# Patient Record
Sex: MALE | Race: WHITE | NOT HISPANIC OR LATINO | Employment: FULL TIME | URBAN - METROPOLITAN AREA
[De-identification: names, ages, dates, MRNs, and addresses within clinical notes are randomized per-mention and may not be internally consistent; named-entity substitution may affect disease eponyms.]

---

## 2017-08-29 ENCOUNTER — OFFICE VISIT (OUTPATIENT)
Dept: UROLOGY | Facility: CLINIC | Age: 57
End: 2017-08-29
Payer: COMMERCIAL

## 2017-08-29 VITALS
WEIGHT: 255 LBS | SYSTOLIC BLOOD PRESSURE: 132 MMHG | HEIGHT: 71 IN | HEART RATE: 72 BPM | BODY MASS INDEX: 35.7 KG/M2 | DIASTOLIC BLOOD PRESSURE: 64 MMHG

## 2017-08-29 DIAGNOSIS — N23 RENAL COLIC ON LEFT SIDE: Primary | ICD-10-CM

## 2017-08-29 DIAGNOSIS — R35.1 NOCTURIA: ICD-10-CM

## 2017-08-29 DIAGNOSIS — R39.198 SLOW URINARY STREAM: ICD-10-CM

## 2017-08-29 DIAGNOSIS — R10.32 LEFT LOWER QUADRANT PAIN: ICD-10-CM

## 2017-08-29 DIAGNOSIS — Z87.442 HISTORY OF RENAL CALCULI: ICD-10-CM

## 2017-08-29 LAB
BACTERIA #/AREA URNS AUTO: NORMAL /HPF
BILIRUB UR QL STRIP: NEGATIVE
CAOX CRY UR QL COMP ASSIST: NORMAL
CLARITY UR REFRACT.AUTO: CLEAR
COLOR UR AUTO: YELLOW
GLUCOSE UR QL STRIP: ABNORMAL
HGB UR QL STRIP: NEGATIVE
HYALINE CASTS UR QL AUTO: 1 /LPF
KETONES UR QL STRIP: NEGATIVE
LEUKOCYTE ESTERASE UR QL STRIP: NEGATIVE
MICROSCOPIC COMMENT: NORMAL
NITRITE UR QL STRIP: NEGATIVE
PH UR STRIP: 5 [PH] (ref 5–8)
PROT UR QL STRIP: NEGATIVE
RBC #/AREA URNS AUTO: 0 /HPF (ref 0–4)
SP GR UR STRIP: 1.02 (ref 1–1.03)
URN SPEC COLLECT METH UR: ABNORMAL
UROBILINOGEN UR STRIP-ACNC: NEGATIVE EU/DL
WBC #/AREA URNS AUTO: 1 /HPF (ref 0–5)
YEAST UR QL AUTO: NORMAL

## 2017-08-29 PROCEDURE — 99204 OFFICE O/P NEW MOD 45 MIN: CPT | Mod: S$GLB,,, | Performed by: UROLOGY

## 2017-08-29 PROCEDURE — 87086 URINE CULTURE/COLONY COUNT: CPT

## 2017-08-29 PROCEDURE — 81001 URINALYSIS AUTO W/SCOPE: CPT

## 2017-08-29 PROCEDURE — 3008F BODY MASS INDEX DOCD: CPT | Mod: S$GLB,,, | Performed by: UROLOGY

## 2017-08-29 PROCEDURE — 99999 PR PBB SHADOW E&M-NEW PATIENT-LVL IV: CPT | Mod: PBBFAC,,, | Performed by: UROLOGY

## 2017-08-29 RX ORDER — METFORMIN HYDROCHLORIDE 500 MG/1
TABLET ORAL
COMMUNITY
Start: 2017-08-02

## 2017-08-29 RX ORDER — KETOROLAC TROMETHAMINE 10 MG/1
10 TABLET, FILM COATED ORAL EVERY 6 HOURS
Qty: 20 TABLET | Refills: 1 | Status: SHIPPED | OUTPATIENT
Start: 2017-08-29 | End: 2017-09-03

## 2017-08-29 RX ORDER — HYDROCODONE BITARTRATE AND ACETAMINOPHEN 10; 325 MG/1; MG/1
1 TABLET ORAL EVERY 6 HOURS PRN
Qty: 20 TABLET | Refills: 0 | Status: SHIPPED | OUTPATIENT
Start: 2017-08-29

## 2017-08-29 RX ORDER — SIMVASTATIN 20 MG/1
TABLET, FILM COATED ORAL
COMMUNITY
Start: 2017-08-03

## 2017-08-29 NOTE — PATIENT INSTRUCTIONS
Obtain U/A,U C+S, KUB and CT renal survey.  Start Flomax, Toradol, Norco.  F/U 1 week After all of of above tests complete.

## 2017-08-29 NOTE — PROGRESS NOTES
Subjective:       Patient ID: Eric Leong is a 56 y.o. male.    Chief Complaint: Prostate Check and Back Pain (history of kidney stones)    57 yo WM with LLQ pain and left flank pain similar to episode of left ureteral calculi  Around 2005. Pain  Started 2 weeks ago and is associated with intermittency and nocturia. No gross blood seen and no fever or chills but pain is worsening and becoming more constant.      Back Pain   This is a new problem. The current episode started 1 to 4 weeks ago. The problem occurs constantly. The problem has been gradually worsening since onset. The quality of the pain is described as burning and stabbing. The pain does not radiate. The pain is at a severity of 6/10. The pain is moderate. The pain is the same all the time. The symptoms are aggravated by bending, twisting and position. Associated symptoms include abdominal pain (LLQ). Pertinent negatives include no bladder incontinence, bowel incontinence, chest pain, dysuria, fever, headaches, leg pain, numbness, paresis, paresthesias, pelvic pain, perianal numbness, tingling, weakness or weight loss. Risk factors include renal stones, lack of exercise and sedentary lifestyle. He has tried nothing for the symptoms.   Abdominal Pain   This is a new problem. The current episode started 1 to 4 weeks ago. The onset quality is sudden. The problem occurs constantly. The problem has been gradually worsening. The pain is located in the LLQ and left flank. The pain is at a severity of 6/10. The pain is moderate. The quality of the pain is sharp and burning. The abdominal pain does not radiate. Associated symptoms include constipation. Pertinent negatives include no anorexia, arthralgias, belching, diarrhea, dysuria, fever, flatus, frequency, headaches, hematochezia, hematuria, melena, myalgias, nausea, vomiting or weight loss. The pain is aggravated by certain positions and movement. The pain is relieved by nothing. He has tried nothing for  the symptoms. Prior diagnostic workup includes CT scan. There is no history of abdominal surgery, colon cancer, Crohn's disease, gallstones, GERD, irritable bowel syndrome, pancreatitis, PUD or ulcerative colitis. Patient's medical history includes kidney stones. Patient's medical history does not include UTI.     Review of Systems   Constitutional: Negative for activity change, appetite change, chills, diaphoresis, fatigue, fever, unexpected weight change and weight loss.   HENT: Negative for congestion, hearing loss, sinus pressure and trouble swallowing.    Eyes: Negative for photophobia, pain, discharge and visual disturbance.   Respiratory: Negative for apnea, cough and shortness of breath.    Cardiovascular: Negative for chest pain, palpitations and leg swelling.   Gastrointestinal: Positive for abdominal pain (LLQ) and constipation. Negative for abdominal distention, anal bleeding, anorexia, blood in stool, bowel incontinence, diarrhea, flatus, hematochezia, melena, nausea, rectal pain and vomiting.   Endocrine: Negative for cold intolerance, heat intolerance, polydipsia, polyphagia and polyuria.   Genitourinary: Negative for bladder incontinence, decreased urine volume, difficulty urinating, discharge, dysuria, enuresis, flank pain, frequency, genital sores, hematuria, pelvic pain, penile pain, penile swelling, scrotal swelling, testicular pain and urgency.   Musculoskeletal: Positive for back pain. Negative for arthralgias and myalgias.   Skin: Negative for color change, pallor, rash and wound.   Allergic/Immunologic: Negative for environmental allergies, food allergies and immunocompromised state.   Neurological: Negative for dizziness, tingling, seizures, weakness, numbness, headaches and paresthesias.   Hematological: Negative for adenopathy. Does not bruise/bleed easily.   Psychiatric/Behavioral: Negative.        Objective:      Physical Exam   Nursing note and vitals reviewed.  Constitutional: He is  oriented to person, place, and time. He appears well-developed and well-nourished.   HENT:   Head: Normocephalic.   Nose: Nose normal.   Mouth/Throat: Oropharynx is clear and moist.   Eyes: Conjunctivae and EOM are normal. Pupils are equal, round, and reactive to light.   Neck: Normal range of motion. Neck supple.   Cardiovascular: Normal rate, regular rhythm, normal heart sounds and intact distal pulses.    Pulmonary/Chest: Effort normal and breath sounds normal.   Abdominal: Soft. Bowel sounds are normal.   Genitourinary: Rectum normal, testes normal and penis normal. Cremasteric reflex is present. Circumcised.   Musculoskeletal: Normal range of motion.   Neurological: He is alert and oriented to person, place, and time. He has normal reflexes.   Skin: Skin is warm and dry.     Psychiatric: He has a normal mood and affect. His behavior is normal. Judgment and thought content normal.       Assessment:       1. Renal colic on left side    2. History of renal calculi    3. Left lower quadrant pain    4. Nocturia        Plan:       Patient Instructions   Obtain U/A,U C+S, KUB and CT renal survey.  Start Flomax, Toradol, Norco.  F/U 1 week After all of of above tests complete.

## 2017-08-29 NOTE — LETTER
August 29, 2017        Cassandra Ledesma MD  [address]   [place] [address]             [place] [facility]  [address] [place] [address] [address]  [place] [address]  Phone: [phone]  Fax: [phone]   Patient: Eric Leong   MR Number: [id number]   YOB: 1960   Date of Visit: 8/29/2017       Dear Dr. Ledesma:    Thank you for referring Eric Leong to me for evaluation. Below are the relevant portions of my assessment and plan of care.            If you have questions, please do not hesitate to call me. I look forward to following Eric along with you.    Sincerely,      Jacob Rodriguez MD           CC  No Recipients

## 2017-08-30 ENCOUNTER — TELEPHONE (OUTPATIENT)
Dept: UROLOGY | Facility: CLINIC | Age: 57
End: 2017-08-30

## 2017-08-31 ENCOUNTER — TELEPHONE (OUTPATIENT)
Dept: UROLOGY | Facility: CLINIC | Age: 57
End: 2017-08-31

## 2017-08-31 LAB — BACTERIA UR CULT: NO GROWTH

## 2017-08-31 NOTE — TELEPHONE ENCOUNTER
----- Message from Jacob Rodriguez MD sent at 8/31/2017 11:20 AM CDT -----  Urine culture is negative

## 2017-08-31 NOTE — TELEPHONE ENCOUNTER
----- Message from Jacob Rodriguez MD sent at 8/31/2017 11:33 AM CDT -----  CT and xray reveal no stones, but pt does have left sided diverticulosis which may be cause of left sided discomfort. Recommend refer to GI for evaluation. Thanks

## 2017-09-18 ENCOUNTER — PATIENT MESSAGE (OUTPATIENT)
Dept: UROLOGY | Facility: CLINIC | Age: 57
End: 2017-09-18

## 2017-09-18 ENCOUNTER — TELEPHONE (OUTPATIENT)
Dept: UROLOGY | Facility: CLINIC | Age: 57
End: 2017-09-18

## 2017-09-19 ENCOUNTER — TELEPHONE (OUTPATIENT)
Dept: UROLOGY | Facility: CLINIC | Age: 57
End: 2017-09-19

## 2017-09-19 NOTE — TELEPHONE ENCOUNTER
----- Message from Marisela Solomon sent at 9/19/2017 10:55 AM CDT -----  Contact: Self [phone]  Calling to talk to nurse in reference of his medical report. Please advice

## 2020-07-20 ENCOUNTER — OFFICE VISIT (OUTPATIENT)
Dept: ORTHOPEDICS | Facility: CLINIC | Age: 60
End: 2020-07-20
Payer: COMMERCIAL

## 2020-07-20 ENCOUNTER — HOSPITAL ENCOUNTER (OUTPATIENT)
Dept: RADIOLOGY | Facility: HOSPITAL | Age: 60
Discharge: HOME | End: 2020-07-20
Attending: ORTHOPAEDIC SURGERY
Payer: COMMERCIAL

## 2020-07-20 DIAGNOSIS — R52 PAIN: ICD-10-CM

## 2020-07-20 DIAGNOSIS — S92.515A CLOSED NONDISPLACED FRACTURE OF PROXIMAL PHALANX OF LESSER TOE OF LEFT FOOT, INITIAL ENCOUNTER: Primary | ICD-10-CM

## 2020-07-20 DIAGNOSIS — R52 PAIN: Primary | ICD-10-CM

## 2020-07-20 DIAGNOSIS — S93.509S: ICD-10-CM

## 2020-07-20 PROCEDURE — 73630 X-RAY EXAM OF FOOT: CPT | Mod: TC,LT

## 2020-07-20 PROCEDURE — 99203 PR OFFICE/OUTPT VISIT, NEW, LEVL III, 30-44 MIN: ICD-10-PCS | Mod: S$GLB,,, | Performed by: ORTHOPAEDIC SURGERY

## 2020-07-20 PROCEDURE — 73630 XR FOOT COMPLETE 3 VIEW LEFT: ICD-10-PCS | Mod: 26,LT,GC, | Performed by: RADIOLOGY

## 2020-07-20 PROCEDURE — 99999 PR PBB SHADOW E&M-EST. PATIENT-LVL I: CPT | Mod: PBBFAC,,, | Performed by: ORTHOPAEDIC SURGERY

## 2020-07-20 PROCEDURE — 99203 OFFICE O/P NEW LOW 30 MIN: CPT | Mod: S$GLB,,, | Performed by: ORTHOPAEDIC SURGERY

## 2020-07-20 PROCEDURE — 73630 X-RAY EXAM OF FOOT: CPT | Mod: 26,LT,GC, | Performed by: RADIOLOGY

## 2020-07-20 PROCEDURE — 99999 PR PBB SHADOW E&M-EST. PATIENT-LVL I: ICD-10-PCS | Mod: PBBFAC,,, | Performed by: ORTHOPAEDIC SURGERY

## 2020-07-20 NOTE — PROGRESS NOTES
Subjective:      Patient ID: Eric Leong is a 59 y.o. male.    Chief Complaint:  Left foot injury  HPI:  This is a 59-year-old male who injured his left foot about 2 months ago sustaining a fracture of his left 5th toe as well as sprains of his 3rd and 4th toes.  He also reports he had laceration of his foot but did not require suture.  He reports that he has some residual discomfort if in his left forefoot laterally and describes pulling sensation occasionally.  He does have some numbness in his L5-S1 distribution due to previous back problem.  He comes in for evaluation.   He reports he returned to work a week ago at the [employer] and had some discomfort in his left foot.    Past Medical History:   Diagnosis Date    Kidney stone     Urinary tract infection        Social History     Occupational History    Not on file   Tobacco Use    Smoking status: Never Smoker    Smokeless tobacco: Current User   Substance and Sexual Activity    Alcohol use: No    Drug use: No    Sexual activity: Not Currently      ROS :  Negative for chest pain, shortness of breath, fevers, or unexplained weight loss.      Objective:         This is a well-developed well-nourished 5 ft 11 in, 255 lb male who walks in with a normal gait.  On standing inspection he has plantigrade alignment of both feet.  There is some mild swelling of his left 5th toe.  There is normal alignment of his toes.  His left 5th toe does sit up off the ground a little bit.  On sitting exam he has full motion of his left ankle and subtalar joint without pain.  He has good distal pulses.  He has normal strength of all muscle groups about his ankle.  He has minimal pain with passive motion of his lesser toes.  There is no gross instability of his lesser toes.    Imaging:  I ordered and reviewed x-rays of his left foot today.  The left foot x-ray reveals a healing fracture of the left 5th toe proximal phalanx.  The fracture is nondisplaced.  All of the toes are  in good alignment.  All of the MTP joints are congruent.      Assessment:           1. Closed nondisplaced fracture of proximal phalanx of lesser toe of left foot, initial encounter     2. Toe sprain, sequela         Plan:       Recommendation:  This gentleman had significant trauma to his left lateral forefoot resulting in a 5th toe proximal phalanx fracture and sprains of the 3rd and 4th toes in addition to a laceration that has healed.  I reassured him that all of the structures are intact and no intervention is warranted at this time.  I believe the symptoms he is having is a result of continued healing from his injuries and should continue to improve.  He can participate in low-impact activity and work as tolerated without causing further damage to his foot.    Follow-up as needed

## 2023-08-23 ENCOUNTER — TELEPHONE (OUTPATIENT)
Dept: UROLOGY | Facility: CLINIC | Age: 63
End: 2023-08-23
Payer: COMMERCIAL

## 2023-08-23 NOTE — TELEPHONE ENCOUNTER
Spoke to patient and states he wanted appointment for kidney stones, offered patient an appointment with NP Marcello Hsu but he stated he is at another doctors office right now, he states he will call us back

## 2023-08-23 NOTE — TELEPHONE ENCOUNTER
----- Message from Danika Mahan sent at 8/23/2023  8:43 AM CDT -----  Type:  Needs Medical Advice    Who Called:  Pt    Would the patient rather a call back or a response via [facility]?  call  Best Call Back Number:  [phone]  Additional Information:  Pt would like to be seen by Dr. Rodriguez again.  Pt was seen in 2017 and is requesting an appt for possible kidney stones.   Pt would like a call back.

## 2024-06-26 DIAGNOSIS — R22.1 MASS OF RIGHT SIDE OF NECK: Primary | ICD-10-CM

## 2024-07-01 ENCOUNTER — HOSPITAL ENCOUNTER (OUTPATIENT)
Dept: RADIOLOGY | Facility: HOSPITAL | Age: 64
Discharge: HOME | End: 2024-07-01
Attending: FAMILY MEDICINE
Payer: MEDICARE

## 2024-07-01 DIAGNOSIS — R22.1 MASS OF RIGHT SIDE OF NECK: ICD-10-CM

## 2024-07-01 PROCEDURE — 76536 US EXAM OF HEAD AND NECK: CPT | Mod: TC,PN

## 2024-07-01 PROCEDURE — 76536 US EXAM OF HEAD AND NECK: CPT | Mod: 26,,, | Performed by: RADIOLOGY
